# Patient Record
Sex: MALE | Race: WHITE | URBAN - METROPOLITAN AREA
[De-identification: names, ages, dates, MRNs, and addresses within clinical notes are randomized per-mention and may not be internally consistent; named-entity substitution may affect disease eponyms.]

---

## 2018-11-24 ENCOUNTER — IMMUNIZATION (OUTPATIENT)
Dept: FAMILY MEDICINE CLINIC | Facility: CLINIC | Age: 53
End: 2018-11-24
Payer: COMMERCIAL

## 2018-11-24 DIAGNOSIS — Z23 NEED FOR VACCINATION: ICD-10-CM

## 2018-11-24 PROCEDURE — 90686 IIV4 VACC NO PRSV 0.5 ML IM: CPT | Performed by: NURSE PRACTITIONER

## 2018-11-24 PROCEDURE — 90471 IMMUNIZATION ADMIN: CPT | Performed by: NURSE PRACTITIONER

## 2019-06-03 ENCOUNTER — OFFICE VISIT (OUTPATIENT)
Dept: DERMATOLOGY CLINIC | Facility: CLINIC | Age: 54
End: 2019-06-03
Payer: COMMERCIAL

## 2019-06-03 DIAGNOSIS — L57.8 SUN-DAMAGED SKIN: ICD-10-CM

## 2019-06-03 DIAGNOSIS — D23.61 BENIGN NEOPLASM OF SKIN OF RIGHT UPPER EXTREMITY AND SHOULDER: ICD-10-CM

## 2019-06-03 DIAGNOSIS — D48.5 NEOPLASM OF UNCERTAIN BEHAVIOR OF SKIN: Primary | ICD-10-CM

## 2019-06-03 PROCEDURE — 11102 TANGNTL BX SKIN SINGLE LES: CPT | Performed by: DERMATOLOGY

## 2019-06-03 PROCEDURE — 88305 TISSUE EXAM BY PATHOLOGIST: CPT | Performed by: DERMATOLOGY

## 2019-06-03 PROCEDURE — 99212 OFFICE O/P EST SF 10 MIN: CPT | Performed by: DERMATOLOGY

## 2019-06-03 PROCEDURE — 99202 OFFICE O/P NEW SF 15 MIN: CPT | Performed by: DERMATOLOGY

## 2019-06-03 NOTE — PROGRESS NOTES
History reviewed. No pertinent past medical history. History reviewed. No pertinent surgical history.   Social History    Socioeconomic History      Marital status:       Spouse name: Not on file      Number of children: Not on file      Years of ed

## 2019-06-03 NOTE — PROGRESS NOTES
HPI:     Chief Complaint     Lesion        HPI     Lesion      Additional comments: New Patient present with dark lesion on R hand and forearm , and L cheek .  Patient denies any personal or family hx of sc          Last edited by Sharma Brittle, [address] [place], [place] [address]linda Berger on 6/ file      Sexual activity: Not on file    Lifestyle      Physical activity:        Days per week: Not on file        Minutes per session: Not on file      Stress: Not on file    Relationships      Social connections:        Talks on phone: Not on file come sooner should they note  anything new or changing.   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

## 2019-06-06 ENCOUNTER — TELEPHONE (OUTPATIENT)
Dept: DERMATOLOGY CLINIC | Facility: CLINIC | Age: 54
End: 2019-06-06

## 2019-06-06 NOTE — TELEPHONE ENCOUNTER
Biopsy to right shoulder is melanoma. Per Pete Flores. FYI. Results released in [facility] [address]

## 2019-06-07 ENCOUNTER — TELEPHONE (OUTPATIENT)
Dept: DERMATOLOGY CLINIC | Facility: CLINIC | Age: 54
End: 2019-06-07

## 2019-06-07 NOTE — TELEPHONE ENCOUNTER
Please let pt know that waiting till the 17th is perfectly fine and will not put him at any increased risk

## 2019-06-12 ENCOUNTER — TELEPHONE (OUTPATIENT)
Dept: SURGERY | Facility: CLINIC | Age: 54
End: 2019-06-12

## 2019-06-15 ENCOUNTER — OFFICE VISIT (OUTPATIENT)
Dept: DERMATOLOGY CLINIC | Facility: CLINIC | Age: 54
End: 2019-06-15
Payer: COMMERCIAL

## 2019-06-15 DIAGNOSIS — L60.9 DISEASE OF NAIL: ICD-10-CM

## 2019-06-15 DIAGNOSIS — D48.5 NEOPLASM OF UNCERTAIN BEHAVIOR OF SKIN: Primary | ICD-10-CM

## 2019-06-15 DIAGNOSIS — D23.60 BENIGN NEOPLASM OF SKIN OF UPPER EXTREMITY AND SHOULDER, UNSPECIFIED LATERALITY: ICD-10-CM

## 2019-06-15 DIAGNOSIS — D23.4 BENIGN NEOPLASM OF SCALP AND SKIN OF NECK: ICD-10-CM

## 2019-06-15 DIAGNOSIS — C43.61 MELANOMA OF RIGHT UPPER ARM (HCC): ICD-10-CM

## 2019-06-15 DIAGNOSIS — D23.70 BENIGN NEOPLASM OF SKIN OF LOWER EXTREMITY, INCLUDING HIP, UNSPECIFIED LATERALITY: ICD-10-CM

## 2019-06-15 DIAGNOSIS — D23.30 BENIGN NEOPLASM OF SKIN OF FACE: ICD-10-CM

## 2019-06-15 DIAGNOSIS — D23.5 BENIGN NEOPLASM OF SKIN OF TRUNK, EXCEPT SCROTUM: ICD-10-CM

## 2019-06-15 DIAGNOSIS — L57.8 SUN-DAMAGED SKIN: ICD-10-CM

## 2019-06-15 DIAGNOSIS — L81.4 SOLAR LENTIGO: ICD-10-CM

## 2019-06-15 PROCEDURE — 99214 OFFICE O/P EST MOD 30 MIN: CPT | Performed by: DERMATOLOGY

## 2019-06-15 PROCEDURE — 11104 PUNCH BX SKIN SINGLE LESION: CPT | Performed by: DERMATOLOGY

## 2019-06-15 PROCEDURE — 88341 IMHCHEM/IMCYTCHM EA ADD ANTB: CPT | Performed by: DERMATOLOGY

## 2019-06-15 PROCEDURE — 88305 TISSUE EXAM BY PATHOLOGIST: CPT | Performed by: DERMATOLOGY

## 2019-06-15 PROCEDURE — 99212 OFFICE O/P EST SF 10 MIN: CPT | Performed by: DERMATOLOGY

## 2019-06-15 PROCEDURE — 88342 IMHCHEM/IMCYTCHM 1ST ANTB: CPT | Performed by: DERMATOLOGY

## 2019-06-15 NOTE — PROGRESS NOTES
HPI:     Chief Complaint     Full Skin Exam        HPI     Full Skin Exam      Additional comments: LOV 06/03/19 pt seen for lesion to his right hand and FA, here for full body check has no new spots of concern at this time, pt has personal Hx of Melanoma file      Number of children: Not on file      Years of education: Not on file      Highest education level: Not on file    Occupational History      Not on file    Social Needs      Financial resource strain: Not on file      Food insecurity:        Worry age.  Patient is well nourished and in no distress    The exam was remarkable for the following:  - there is scabbing at the biopsy site of the melanoma on the upper right shoulder.  - melanocytic nevi are fairly few in number and are uniform in color, sha or higher, hats, discussed  Benign neoplasm of skin of upper extremity and shoulder, unspecified laterality  Benign neoplasm of skin of lower extremity, including hip, unspecified laterality    Orders Placed This Encounter      PUNCH BIOPSY SKIN SINGLE LES

## 2019-06-15 NOTE — PROCEDURES
Procedural Report for Punch Biopsy    Procedure: With the patient is a supine position, the skin was scrubbed with alcohol. Anesthesia was obtained by injecting 2 mL of 1% Xylocaine with Epinephrine. A circular punch, 4 mm.  In size was used to incise

## 2019-06-17 ENCOUNTER — OFFICE VISIT (OUTPATIENT)
Dept: SURGERY | Facility: CLINIC | Age: 54
End: 2019-06-17
Payer: COMMERCIAL

## 2019-06-17 VITALS
DIASTOLIC BLOOD PRESSURE: 88 MMHG | RESPIRATION RATE: 16 BRPM | HEIGHT: 72 IN | TEMPERATURE: 98 F | WEIGHT: 199 LBS | BODY MASS INDEX: 26.95 KG/M2 | SYSTOLIC BLOOD PRESSURE: 152 MMHG | HEART RATE: 78 BPM | OXYGEN SATURATION: 98 %

## 2019-06-17 DIAGNOSIS — C43.61 MALIGNANT MELANOMA OF RIGHT SHOULDER (HCC): Primary | ICD-10-CM

## 2019-06-17 PROCEDURE — 99245 OFF/OP CONSLTJ NEW/EST HI 55: CPT | Performed by: SURGERY

## 2019-06-17 NOTE — CONSULTS
[id number] [facility] Surgical Oncology    Patient Name:  Washington Dailey   YOB: 1965   Gender:  Male   Appt Date:  6/17/2019   Provider:  Mari Romero MD   Insurance:  [facility]  Referring Dermatologist: Dr. Shanta Adame · GENERAL HEALTH: feels well, no fatigue. · SKIN:+ change in mole.    · HEENT: denies pain  · RESPIRATORY: denies shortness of breath, wheezing or cough   · CARDIOVASCULAR: denies chest pain, SOB, edema,orthopnea, no palpitations   · GI: denies nausea, vo Mitotic Rate  None identified    Microsatellite(s)  Not identified    Lymphovascular Invasion  Not identified    Neurotropism  Not identified    Tumor-Infiltrating Lymphocytes  Present, nonbrisk    Tumor Regression  Not identified    MARGINS     Peripheral

## 2019-06-27 ENCOUNTER — OFFICE VISIT (OUTPATIENT)
Dept: DERMATOLOGY CLINIC | Facility: CLINIC | Age: 54
End: 2019-06-27
Payer: COMMERCIAL

## 2019-06-27 DIAGNOSIS — Z48.02 VISIT FOR SUTURE REMOVAL: Primary | ICD-10-CM

## 2019-06-27 NOTE — PROGRESS NOTES
HPI:     Chief Complaint     Suture Removal        HPI     Suture Removal      Additional comments: Patient present for suture removal .Edges in tact no bleeding or leakage.  Provider exam site , flash monzon          Last edited by Gale Jimenez on file      Intimate partner violence:        Fear of current or ex partner: Not on file        Emotionally abused: Not on file        Physically abused: Not on file        Forced sexual activity: Not on file    Other Topics      Concerns:        Grew up

## 2019-07-08 ENCOUNTER — TELEPHONE (OUTPATIENT)
Dept: SURGERY | Facility: CLINIC | Age: 54
End: 2019-07-08

## 2019-07-08 NOTE — TELEPHONE ENCOUNTER
Confirmed procedure appointment for 7/10/19 at [address] [place] [address] patient to take tylenol prior to appointment. Patient voiced understanding.

## 2019-07-10 ENCOUNTER — OFFICE VISIT (OUTPATIENT)
Dept: SURGERY | Facility: CLINIC | Age: 54
End: 2019-07-10
Payer: COMMERCIAL

## 2019-07-10 VITALS
BODY MASS INDEX: 27.11 KG/M2 | HEIGHT: 72 IN | HEART RATE: 74 BPM | SYSTOLIC BLOOD PRESSURE: 153 MMHG | RESPIRATION RATE: 16 BRPM | OXYGEN SATURATION: 98 % | WEIGHT: 200.19 LBS | DIASTOLIC BLOOD PRESSURE: 87 MMHG

## 2019-07-10 DIAGNOSIS — C43.61 MALIGNANT MELANOMA OF RIGHT SHOULDER (HCC): Primary | ICD-10-CM

## 2019-07-10 PROCEDURE — 13121 CMPLX RPR S/A/L 2.6-7.5 CM: CPT | Performed by: SURGERY

## 2019-07-10 PROCEDURE — 88305 TISSUE EXAM BY PATHOLOGIST: CPT | Performed by: SURGERY

## 2019-07-10 PROCEDURE — 11603 EXC TR-EXT MAL+MARG 2.1-3 CM: CPT | Performed by: SURGERY

## 2019-07-10 NOTE — PATIENT INSTRUCTIONS
1.Change guaze as needed if it becomes saturated. You may remove gauze and shower after 2 days or as directed. 2. Do not soak in water or go swimming until sutures are removed.   3. After showering, you may leave incision uncovered and open to air or cover

## 2019-07-10 NOTE — PROCEDURES
Surgical Oncology Procedure      Preop and postop diagnoses: Malignant melanoma right shoulder, pT1a    Procedure: 1. Wide excision melanoma right shoulder, 2.5 cm.  2.  Complex closure, 5 cm.   Surgeon:    Gianna García MD    Assistant: Elba Singh,

## 2019-07-11 ENCOUNTER — TELEPHONE (OUTPATIENT)
Dept: SURGERY | Facility: CLINIC | Age: 54
End: 2019-07-11

## 2019-07-11 NOTE — TELEPHONE ENCOUNTER
Called to check on patient s/p wide excision yesterday. He has been doing well. Denies pain. Denies warmth, erythema, or drainage from site of incision. Reviewed incisional care. Confirmed post-op appointment for 7/31/19.  Patient knows to call back sooner

## 2019-07-12 ENCOUNTER — TELEPHONE (OUTPATIENT)
Dept: SURGERY | Facility: CLINIC | Age: 54
End: 2019-07-12

## 2019-07-12 NOTE — TELEPHONE ENCOUNTER
Call to pt regarding pathology results. Informed pt that everything was completely excised and there was no residual malignancy. Pt verbalized understanding.  Confirmed appt on 7/31/19 at 1:15pm.

## 2019-07-17 ENCOUNTER — TELEPHONE (OUTPATIENT)
Dept: SURGERY | Facility: CLINIC | Age: 54
End: 2019-07-17

## 2019-07-17 NOTE — TELEPHONE ENCOUNTER
Returned patient's message stating that he is having some redness near his incision. Request callback from patient to schedule office visit for assessment.

## 2019-07-24 ENCOUNTER — TELEPHONE (OUTPATIENT)
Dept: FAMILY MEDICINE CLINIC | Facility: CLINIC | Age: 54
End: 2019-07-24

## 2019-07-24 ENCOUNTER — TELEPHONE (OUTPATIENT)
Dept: SURGERY | Facility: CLINIC | Age: 54
End: 2019-07-24

## 2019-07-24 NOTE — TELEPHONE ENCOUNTER
Pt left a message stating that he saw his PCP today and they had mentioned to him that it would be best for him to have his sutures taken out today instead of waiting until next Wednesday.      Returned call to pt to offer an appointment this Friday to have

## 2019-07-24 NOTE — TELEPHONE ENCOUNTER
Discussed with pt. Pt was calling for culture results for his toe - still pending, pt informed we will contact him as soon as we get a final result.

## 2019-07-24 NOTE — TELEPHONE ENCOUNTER
Spoke to pt and he stated his PCP took his sutures out today. Pt denies any concerns with his incision. Pt stated Patt Collins has a post op appointment with Dr. Krish Robledo on 7/31/19 and if he still needs to come.  Advised pt I will follow up with Jessica Joel PA-C and

## 2019-07-26 ENCOUNTER — TELEPHONE (OUTPATIENT)
Dept: SURGERY | Facility: CLINIC | Age: 54
End: 2019-07-26

## 2019-07-26 NOTE — TELEPHONE ENCOUNTER
Informed patient that he does not need to be seen for post op next week since sutures were removed by pcp and patient has no complaints. Patient confirmed he will follow up with derm and keep 6 month f/u with us if needed.

## 2019-07-26 NOTE — TELEPHONE ENCOUNTER
Patient reports his PCP has removed his suture. He states his incision is c/d/i. There is no warmth, erythema, or drainage. He denies fever or chills. The patient is going on a business trip and reports he will not be able to come in next week.  Reviewed si

## 2019-08-08 NOTE — PROGRESS NOTES
Patient informed of results and recommendations with a verbal understanding. He will check with his doctor to see if he has LFTs and he will call us back. He would like to proceed with med.  Awaiting cb

## 2019-08-16 ENCOUNTER — TELEPHONE (OUTPATIENT)
Dept: DERMATOLOGY CLINIC | Facility: CLINIC | Age: 54
End: 2019-08-16

## 2019-08-16 DIAGNOSIS — B35.1 ONYCHOMYCOSIS: Primary | ICD-10-CM

## 2019-08-16 NOTE — TELEPHONE ENCOUNTER
Dr. Silva Cunha please see below. Would you like to review his CMP results from April? They are available in [address]. Dated 4/13/18.

## 2019-08-16 NOTE — TELEPHONE ENCOUNTER
Since labs were done almost 1.5 years ago, these would not be adequate- would need labs done within past 6 mo before prescribing the Lamisil- so please put in order for LFTs (hepatic panel) for him to do at his convenience- does not need to be facting

## 2019-08-16 NOTE — TELEPHONE ENCOUNTER
Pt has a complete metabolic panel results from last year 4/13/2018 from pcp. Is this what [facility]SS is looking for?   Please advise

## 2019-11-07 ENCOUNTER — OFFICE VISIT (OUTPATIENT)
Dept: DERMATOLOGY CLINIC | Facility: CLINIC | Age: 54
End: 2019-11-07
Payer: COMMERCIAL

## 2019-11-07 ENCOUNTER — APPOINTMENT (OUTPATIENT)
Dept: LAB | Age: 54
End: 2019-11-07
Attending: DERMATOLOGY
Payer: COMMERCIAL

## 2019-11-07 DIAGNOSIS — Z85.820 PERSONAL HISTORY OF MALIGNANT MELANOMA OF SKIN: Primary | ICD-10-CM

## 2019-11-07 DIAGNOSIS — D23.70 BENIGN NEOPLASM OF SKIN OF LOWER EXTREMITY, INCLUDING HIP, UNSPECIFIED LATERALITY: ICD-10-CM

## 2019-11-07 DIAGNOSIS — D23.5 BENIGN NEOPLASM OF SKIN OF TRUNK, EXCEPT SCROTUM: ICD-10-CM

## 2019-11-07 DIAGNOSIS — D23.30 BENIGN NEOPLASM OF SKIN OF FACE: ICD-10-CM

## 2019-11-07 DIAGNOSIS — C43.61 MELANOMA OF RIGHT UPPER ARM (HCC): ICD-10-CM

## 2019-11-07 DIAGNOSIS — L81.4 SOLAR LENTIGO: ICD-10-CM

## 2019-11-07 DIAGNOSIS — D23.61 BENIGN NEOPLASM OF SKIN OF RIGHT UPPER EXTREMITY AND SHOULDER: ICD-10-CM

## 2019-11-07 DIAGNOSIS — B35.1 ONYCHOMYCOSIS: ICD-10-CM

## 2019-11-07 DIAGNOSIS — D23.4 BENIGN NEOPLASM OF SCALP AND SKIN OF NECK: ICD-10-CM

## 2019-11-07 DIAGNOSIS — D23.60 BENIGN NEOPLASM OF SKIN OF UPPER EXTREMITY AND SHOULDER, UNSPECIFIED LATERALITY: ICD-10-CM

## 2019-11-07 PROCEDURE — 99214 OFFICE O/P EST MOD 30 MIN: CPT | Performed by: DERMATOLOGY

## 2019-11-07 PROCEDURE — 36415 COLL VENOUS BLD VENIPUNCTURE: CPT | Performed by: DERMATOLOGY

## 2019-11-07 PROCEDURE — 80076 HEPATIC FUNCTION PANEL: CPT | Performed by: DERMATOLOGY

## 2019-11-07 RX ORDER — TERBINAFINE HYDROCHLORIDE 250 MG/1
TABLET ORAL
Qty: 30 TABLET | Refills: 2 | Status: SHIPPED | OUTPATIENT
Start: 2019-11-07 | End: 2021-01-14 | Stop reason: ALTCHOICE

## 2019-11-07 NOTE — PROGRESS NOTES
HPI:     Chief Complaint     Lesion        HPI     Lesion      Additional comments: LOV 6/27/2019 Patient present for full body skin exam . Patient has hx of Melanoma           Last edited by Tiffany Diez on 11/7/2019 10:18 AM. (History)        Anthony Vargas Not on file        Non-medical: Not on file    Tobacco Use      Smoking status: Never Smoker      Smokeless tobacco: Never Used    Substance and Sexual Activity      Alcohol use: Yes        Frequency: 2-4 times a month      Drug use: Not on file      Sexua on face, trunk and extremities-the new lesion on left cheek is a small brown macule  - there are some cherry red papules  - there are many brown stuck on keratoses.   –Several toenails are slightly greenish with onycholysis        ASSESSMENT/PLAN:   [facility]

## 2020-02-03 ENCOUNTER — TELEPHONE (OUTPATIENT)
Dept: SURGERY | Facility: CLINIC | Age: 55
End: 2020-02-03

## 2020-02-03 NOTE — TELEPHONE ENCOUNTER
Contacted patient to confirm he has appointment with Derm, Dr. Anya Augustin on Friday. Patient informed he will be due to see Dr. Jair Fuller in 3 months for skin check, alternating with his derm appointments.  Appointment confirmed for 5/20/20 at 10:30am.

## 2020-02-08 ENCOUNTER — OFFICE VISIT (OUTPATIENT)
Dept: DERMATOLOGY CLINIC | Facility: CLINIC | Age: 55
End: 2020-02-08
Payer: COMMERCIAL

## 2020-02-08 DIAGNOSIS — D23.4 BENIGN NEOPLASM OF SCALP AND SKIN OF NECK: ICD-10-CM

## 2020-02-08 DIAGNOSIS — Z85.820 PERSONAL HISTORY OF MALIGNANT MELANOMA OF SKIN: Primary | ICD-10-CM

## 2020-02-08 DIAGNOSIS — L81.4 SOLAR LENTIGO: ICD-10-CM

## 2020-02-08 DIAGNOSIS — D23.70 BENIGN NEOPLASM OF SKIN OF LOWER EXTREMITY, INCLUDING HIP, UNSPECIFIED LATERALITY: ICD-10-CM

## 2020-02-08 DIAGNOSIS — D23.60 BENIGN NEOPLASM OF SKIN OF UPPER EXTREMITY AND SHOULDER, UNSPECIFIED LATERALITY: ICD-10-CM

## 2020-02-08 DIAGNOSIS — D23.5 BENIGN NEOPLASM OF SKIN OF TRUNK, EXCEPT SCROTUM: ICD-10-CM

## 2020-02-08 DIAGNOSIS — D23.30 BENIGN NEOPLASM OF SKIN OF FACE: ICD-10-CM

## 2020-02-08 PROCEDURE — 99214 OFFICE O/P EST MOD 30 MIN: CPT | Performed by: DERMATOLOGY

## 2020-02-08 NOTE — PROGRESS NOTES
HPI:     Chief Complaint     Full Skin Exam        HPI     Full Skin Exam      Additional comments: established pt, presents for 3 months skin exam, personal hx of melanoma to right shoulder in 2019. Pt denies any skin changes.            Last edited by Marco Antonio on file        Inability: Not on file      Transportation needs:        Medical: Not on file        Non-medical: Not on file    Tobacco Use      Smoking status: Never Smoker      Smokeless tobacco: Never Used    Substance and Sexual Activity      Alcohol u above.  – There is no conjunctival pigmentation.  - melanocytic nevi are uniform in color, shape and borders.   They are relatively few in number  -there are scattered blotchy brown macules on face, trunk and extremities  - there are some cherry red papules

## 2020-07-14 ENCOUNTER — OFFICE VISIT (OUTPATIENT)
Dept: DERMATOLOGY CLINIC | Facility: CLINIC | Age: 55
End: 2020-07-14
Payer: COMMERCIAL

## 2020-07-14 DIAGNOSIS — L81.4 SOLAR LENTIGO: ICD-10-CM

## 2020-07-14 DIAGNOSIS — Z85.820 PERSONAL HISTORY OF MALIGNANT MELANOMA OF SKIN: Primary | ICD-10-CM

## 2020-07-14 DIAGNOSIS — D23.60 BENIGN NEOPLASM OF SKIN OF UPPER EXTREMITY AND SHOULDER, UNSPECIFIED LATERALITY: ICD-10-CM

## 2020-07-14 DIAGNOSIS — D23.5 BENIGN NEOPLASM OF SKIN OF TRUNK, EXCEPT SCROTUM: ICD-10-CM

## 2020-07-14 DIAGNOSIS — D23.4 BENIGN NEOPLASM OF SCALP AND SKIN OF NECK: ICD-10-CM

## 2020-07-14 DIAGNOSIS — D23.30 BENIGN NEOPLASM OF SKIN OF FACE: ICD-10-CM

## 2020-07-14 DIAGNOSIS — D23.70 BENIGN NEOPLASM OF SKIN OF LOWER EXTREMITY, INCLUDING HIP, UNSPECIFIED LATERALITY: ICD-10-CM

## 2020-07-14 PROCEDURE — 99214 OFFICE O/P EST MOD 30 MIN: CPT | Performed by: DERMATOLOGY

## 2020-07-14 NOTE — PROGRESS NOTES
HPI:     Chief Complaint     Full Skin Exam        HPI     Full Skin Exam      Additional comments: LOV 2/8/2020 Patient present for full body skin exam .Patient has hx of melanom          Last edited by Anila Krishna, Tiffany Beregr on 7/14/2020  3:28 PM. (History file    Social Needs      Financial resource strain: Not on file      Food insecurity:        Worry: Not on file        Inability: Not on file      Transportation needs:        Medical: Not on file        Non-medical: Not on file    Tobacco Use      Marcoin right shoulder  –There is no appreciable adenopathy in locations as noted above.   –There is no conjunctival pigmentation.  - melanocytic nevi are uniform in color, shape and borders.   -there are scattered blotchy brown macules on face, trunk and extremiti

## 2021-01-14 ENCOUNTER — OFFICE VISIT (OUTPATIENT)
Dept: DERMATOLOGY CLINIC | Facility: CLINIC | Age: 56
End: 2021-01-14
Payer: COMMERCIAL

## 2021-01-14 DIAGNOSIS — D18.01 HEMANGIOMA OF SKIN AND SUBCUTANEOUS TISSUE: ICD-10-CM

## 2021-01-14 DIAGNOSIS — D23.4 BENIGN NEOPLASM OF SCALP AND SKIN OF NECK: ICD-10-CM

## 2021-01-14 DIAGNOSIS — D23.60 BENIGN NEOPLASM OF SKIN OF UPPER EXTREMITY AND SHOULDER, UNSPECIFIED LATERALITY: ICD-10-CM

## 2021-01-14 DIAGNOSIS — D23.5 BENIGN NEOPLASM OF SKIN OF TRUNK, EXCEPT SCROTUM: ICD-10-CM

## 2021-01-14 DIAGNOSIS — L57.0 ACTINIC KERATOSIS: ICD-10-CM

## 2021-01-14 DIAGNOSIS — L81.4 SOLAR LENTIGO: ICD-10-CM

## 2021-01-14 DIAGNOSIS — Z85.820 PERSONAL HISTORY OF MALIGNANT MELANOMA OF SKIN: Primary | ICD-10-CM

## 2021-01-14 DIAGNOSIS — D23.70 BENIGN NEOPLASM OF SKIN OF LOWER EXTREMITY, INCLUDING HIP, UNSPECIFIED LATERALITY: ICD-10-CM

## 2021-01-14 DIAGNOSIS — D23.30 BENIGN NEOPLASM OF SKIN OF FACE: ICD-10-CM

## 2021-01-14 DIAGNOSIS — D48.5 NEOPLASM OF UNCERTAIN BEHAVIOR OF SKIN: ICD-10-CM

## 2021-01-14 PROCEDURE — 17000 DESTRUCT PREMALG LESION: CPT | Performed by: DERMATOLOGY

## 2021-01-14 PROCEDURE — 99214 OFFICE O/P EST MOD 30 MIN: CPT | Performed by: DERMATOLOGY

## 2021-01-14 PROCEDURE — 88305 TISSUE EXAM BY PATHOLOGIST: CPT | Performed by: DERMATOLOGY

## 2021-01-14 PROCEDURE — 11102 TANGNTL BX SKIN SINGLE LES: CPT | Performed by: DERMATOLOGY

## 2021-01-14 NOTE — PROGRESS NOTES
HPI:     Chief Complaint     Full Skin Exam        HPI     Full Skin Exam      Additional comments: LOV 7/14/2020 Patient present for full body skin exam. Patient has hx Melanoma           Last edited by Donato Cedillo, Tiffany Berger on 1/14/2021  3:50 PM. (History Medical: Not on file        Non-medical: Not on file    Tobacco Use      Smoking status: Never Smoker      Smokeless tobacco: Never Used    Substance and Sexual Activity      Alcohol use: Yes        Frequency: 2-4 times a month      Drug use: Not on file examined with dermoscopy. There is however an oblong shaped darkly pigmented macule at the right mid back.   Some stretching of reticular network on dermoscopy  -there are scattered blotchy brown macules on face, trunk and extremities  - there are some nellie Referral Orders:  No orders of the defined types were placed in this encounter.         1/14/2021  Hazel Madsen

## 2021-01-18 NOTE — PROGRESS NOTES
Results logged in. Patient informed of test results and all [facility]SS' recommendations. Voiced understanding.

## 2021-10-18 ENCOUNTER — OFFICE VISIT (OUTPATIENT)
Dept: DERMATOLOGY CLINIC | Facility: CLINIC | Age: 56
End: 2021-10-18
Payer: COMMERCIAL

## 2021-10-18 DIAGNOSIS — Z85.820 PERSONAL HISTORY OF MALIGNANT MELANOMA OF SKIN: ICD-10-CM

## 2021-10-18 DIAGNOSIS — L57.0 ACTINIC KERATOSIS: Primary | ICD-10-CM

## 2021-10-18 DIAGNOSIS — L81.4 SOLAR LENTIGO: ICD-10-CM

## 2021-10-18 DIAGNOSIS — D22.9 MULTIPLE MELANOCYTIC NEVI: ICD-10-CM

## 2021-10-18 DIAGNOSIS — D18.01 HEMANGIOMA OF SKIN AND SUBCUTANEOUS TISSUE: ICD-10-CM

## 2021-10-18 PROCEDURE — 99213 OFFICE O/P EST LOW 20 MIN: CPT | Performed by: DERMATOLOGY

## 2021-10-18 PROCEDURE — 17000 DESTRUCT PREMALG LESION: CPT | Performed by: DERMATOLOGY

## 2021-10-18 RX ORDER — TAMSULOSIN HYDROCHLORIDE 0.4 MG/1
0.4 CAPSULE ORAL DAILY
COMMUNITY
Start: 2021-09-02

## 2021-10-18 NOTE — PROGRESS NOTES
HPI:     Chief Complaint     Derm Problem        HPI     Derm Problem      Additional comments: LOV 1/14/21. Patient presents for 6 mon full body check.  [facility]          Last edited by ANDER Prado on 10/18/2021  8:12 AM. (History)          Pamela Winn anesthetic: No    Social History Narrative      Not on file    Social Determinants of Health  Financial Resource Strain:       Difficulty of Paying Living Expenses: Not on file  Food Insecurity:       Worried About Running Out of Food in the Last Year: Not axillary adenopathy  - melanocytic nevi are uniform in color, shape and borders.  -there are scattered blotchy brown macules on face, trunk and extremities  - there is 1 area of erythema and keratotic scale on the right cheek  - there are some cherry red p

## 2022-02-28 PROBLEM — S52.572A CLOSED DIE-PUNCH INTRA-ARTICULAR FRACTURE OF DISTAL RADIUS, LEFT, INITIAL ENCOUNTER: Status: ACTIVE | Noted: 2022-02-28

## 2022-04-07 PROBLEM — S52.572D RADIUS FRACTURE DIE PUNCH, CLOSED, LEFT, WITH ROUTINE HEALING, SUBSEQUENT ENCOUNTER: Status: ACTIVE | Noted: 2022-04-07

## 2022-04-18 ENCOUNTER — OFFICE VISIT (OUTPATIENT)
Dept: DERMATOLOGY CLINIC | Facility: CLINIC | Age: 57
End: 2022-04-18
Payer: COMMERCIAL

## 2022-04-18 DIAGNOSIS — L81.4 SOLAR LENTIGO: ICD-10-CM

## 2022-04-18 DIAGNOSIS — D22.9 MULTIPLE MELANOCYTIC NEVI: Primary | ICD-10-CM

## 2022-04-18 DIAGNOSIS — Z85.820 PERSONAL HISTORY OF MALIGNANT MELANOMA OF SKIN: ICD-10-CM

## 2022-04-18 DIAGNOSIS — D23.60 BENIGN NEOPLASM OF SKIN OF UPPER EXTREMITY AND SHOULDER, UNSPECIFIED LATERALITY: ICD-10-CM

## 2022-04-18 DIAGNOSIS — D23.4 BENIGN NEOPLASM OF SCALP AND SKIN OF NECK: ICD-10-CM

## 2022-04-18 DIAGNOSIS — L57.0 ACTINIC KERATOSIS: ICD-10-CM

## 2022-04-18 DIAGNOSIS — D23.70 BENIGN NEOPLASM OF SKIN OF LOWER EXTREMITY, INCLUDING HIP, UNSPECIFIED LATERALITY: ICD-10-CM

## 2022-04-18 DIAGNOSIS — D23.30 BENIGN NEOPLASM OF SKIN OF FACE: ICD-10-CM

## 2022-04-18 DIAGNOSIS — D23.5 BENIGN NEOPLASM OF SKIN OF TRUNK, EXCEPT SCROTUM: ICD-10-CM

## 2022-04-18 PROCEDURE — 17000 DESTRUCT PREMALG LESION: CPT | Performed by: DERMATOLOGY

## 2022-04-18 PROCEDURE — 99214 OFFICE O/P EST MOD 30 MIN: CPT | Performed by: DERMATOLOGY

## 2022-10-24 ENCOUNTER — OFFICE VISIT (OUTPATIENT)
Dept: DERMATOLOGY CLINIC | Facility: CLINIC | Age: 57
End: 2022-10-24
Payer: COMMERCIAL

## 2022-10-24 DIAGNOSIS — Z85.820 PERSONAL HISTORY OF MALIGNANT MELANOMA OF SKIN: ICD-10-CM

## 2022-10-24 DIAGNOSIS — L57.0 ACTINIC KERATOSIS: Primary | ICD-10-CM

## 2022-10-24 DIAGNOSIS — L81.4 SOLAR LENTIGO: ICD-10-CM

## 2022-10-24 DIAGNOSIS — D22.9 MULTIPLE MELANOCYTIC NEVI: ICD-10-CM

## 2022-10-24 DIAGNOSIS — Z86.018 HISTORY OF DYSPLASTIC NEVUS: ICD-10-CM

## 2022-10-24 PROCEDURE — 99213 OFFICE O/P EST LOW 20 MIN: CPT | Performed by: DERMATOLOGY

## 2022-10-24 RX ORDER — COVID-19 ANTIGEN TEST
KIT MISCELLANEOUS
COMMUNITY
Start: 2022-07-24

## 2022-10-24 RX ORDER — SILDENAFIL 100 MG/1
50 TABLET, FILM COATED ORAL
COMMUNITY
Start: 2022-08-10

## 2023-04-24 ENCOUNTER — OFFICE VISIT (OUTPATIENT)
Dept: DERMATOLOGY CLINIC | Facility: CLINIC | Age: 58
End: 2023-04-24

## 2023-04-24 DIAGNOSIS — D23.70 BENIGN NEOPLASM OF SKIN OF LOWER EXTREMITY, INCLUDING HIP, UNSPECIFIED LATERALITY: ICD-10-CM

## 2023-04-24 DIAGNOSIS — Z86.018 HISTORY OF DYSPLASTIC NEVUS: ICD-10-CM

## 2023-04-24 DIAGNOSIS — D23.60 BENIGN NEOPLASM OF SKIN OF UPPER EXTREMITY AND SHOULDER, UNSPECIFIED LATERALITY: ICD-10-CM

## 2023-04-24 DIAGNOSIS — D22.9 MULTIPLE MELANOCYTIC NEVI: ICD-10-CM

## 2023-04-24 DIAGNOSIS — D23.5 BENIGN NEOPLASM OF SKIN OF TRUNK, EXCEPT SCROTUM: ICD-10-CM

## 2023-04-24 DIAGNOSIS — D23.30 BENIGN NEOPLASM OF SKIN OF FACE: ICD-10-CM

## 2023-04-24 DIAGNOSIS — D18.01 HEMANGIOMA OF SKIN AND SUBCUTANEOUS TISSUE: ICD-10-CM

## 2023-04-24 DIAGNOSIS — L81.4 SOLAR LENTIGO: ICD-10-CM

## 2023-04-24 DIAGNOSIS — L57.0 ACTINIC KERATOSIS: Primary | ICD-10-CM

## 2023-04-24 DIAGNOSIS — D23.4 BENIGN NEOPLASM OF SCALP AND SKIN OF NECK: ICD-10-CM

## 2023-04-24 DIAGNOSIS — Z85.820 PERSONAL HISTORY OF MALIGNANT MELANOMA OF SKIN: ICD-10-CM

## 2023-04-24 PROCEDURE — 99213 OFFICE O/P EST LOW 20 MIN: CPT | Performed by: DERMATOLOGY

## 2023-10-25 ENCOUNTER — OFFICE VISIT (OUTPATIENT)
Dept: DERMATOLOGY CLINIC | Facility: CLINIC | Age: 58
End: 2023-10-25
Payer: COMMERCIAL

## 2023-10-25 DIAGNOSIS — L81.4 SOLAR LENTIGO: ICD-10-CM

## 2023-10-25 DIAGNOSIS — L57.0 ACTINIC KERATOSIS: ICD-10-CM

## 2023-10-25 DIAGNOSIS — L82.1 SK (SEBORRHEIC KERATOSIS): Primary | ICD-10-CM

## 2023-10-25 DIAGNOSIS — D18.01 HEMANGIOMA OF SKIN AND SUBCUTANEOUS TISSUE: ICD-10-CM

## 2023-10-25 DIAGNOSIS — D22.9 MULTIPLE MELANOCYTIC NEVI: ICD-10-CM

## 2023-10-25 DIAGNOSIS — Z85.820 PERSONAL HISTORY OF MALIGNANT MELANOMA OF SKIN: ICD-10-CM

## 2023-10-25 DIAGNOSIS — Z86.018 HISTORY OF DYSPLASTIC NEVUS: ICD-10-CM

## 2023-10-25 DIAGNOSIS — D23.9 BENIGN NEOPLASM OF SKIN, UNSPECIFIED LOCATION: ICD-10-CM

## 2023-10-25 PROCEDURE — 99213 OFFICE O/P EST LOW 20 MIN: CPT | Performed by: DERMATOLOGY

## 2023-11-06 NOTE — PROGRESS NOTES
Charla Parisi is a 60year old male. HPI:     CC:  Patient presents with:  Full Skin Exam: LOV 04/23. Pt has hx of melanoma and dysplastic nevus. Pt present for full body exam. Pt denies hx of skin ca. Allergies:  Patient has no known allergies. HISTORY:    Past Medical History:   Diagnosis Date    Actinic keratosis 10/2021    Nose    Dysplastic nevus 2021    mild dysplasia, right mid back    Melanoma of skin ([address] 2019    Right shoulder 0.4mm      Past Surgical History:   Procedure Laterality Date    COLONOSCOPY      OTHER SURGICAL HISTORY  1986    thumb repair    VASECTOMY        Family History   Problem Relation Age of Onset    Diabetes Father     Obesity Father       Social History     Socioeconomic History    Marital status:    Tobacco Use    Smoking status: Never    Smokeless tobacco: Never   Substance and Sexual Activity    Alcohol use: Yes     Alcohol/week: 4.0 standard drinks of alcohol     Types: 3 Cans of beer, 1 Shots of liquor per week    Drug use: Never   Other Topics Concern    Grew up on a farm No    History of tanning No    Outdoor occupation No    Reaction to local anesthetic No    Pt has a pacemaker No    Pt has a defibrillator No        Current Outpatient Medications   Medication Sig Dispense Refill    tamsulosin (FLOMAX) cap Take 1 capsule (0.4 mg total) by mouth daily. QUICKVUE AT-HOME COVID-19 TEST In Vitro Kit AS DIR (Patient not taking: Reported on 10/24/2022)      Sildenafil Citrate 100 MG Oral Tab Take 50 mg by mouth.  (Patient not taking: Reported on 4/24/2023)       Allergies:   No Known Allergies    Past Medical History:   Diagnosis Date    Actinic keratosis 10/2021    Nose    Dysplastic nevus 2021    mild dysplasia, right mid back    Melanoma of skin ([address] 2019    Right shoulder 0.4mm     Past Surgical History:   Procedure Laterality Date    COLONOSCOPY      OTHER SURGICAL HISTORY  1986    thumb repair    VASECTOMY       Social History    Socioeconomic History      Marital status:       Spouse name: Not on file      Number of children: Not on file      Years of education: Not on file      Highest education level: Not on file    Occupational History      Not on file    Tobacco Use      Smoking status: Never      Smokeless tobacco: Never    Substance and Sexual Activity      Alcohol use: Yes        Alcohol/week: 4.0 standard drinks of alcohol        Types: 3 Cans of beer, 1 Shots of liquor per week      Drug use: Never      Sexual activity: Not on file    Other Topics      Concerns:        Grew up on a farm: No        History of tanning: No        Outdoor occupation: No        Reaction to local anesthetic: No        Pt has a pacemaker: No        Pt has a defibrillator: No    Social History Narrative      Not on file    Social Determinants of Health  Financial Resource Strain: Not on file  Food Insecurity: Not on file  Transportation Needs: Not on file  Physical Activity: Not on file  Stress: Not on file  Social Connections: Not on file  Housing Stability: Not on file  Family History   Problem Relation Age of Onset    Diabetes Father     Obesity Father        There were no vitals filed for this visit. HPI:    Patient presents with:  Full Skin Exam: LOV 04/23. Pt has hx of melanoma and dysplastic nevus. Pt present for full body exam. Pt denies hx of skin ca. Patient with history of melanoma dysplastic nevi lower back  Overall has been doing well no particular lesions of concern has been careful sun protection. History of AK's. Doing some traveling. Careful with sun protection. No particular concerns will continue to be careful sun protection past notes/ records and appropriate/relevant lab results including pathology and past body maps reviewed. Including outside notes/ PCP notes as appropriate. Updated and new information noted in current visit. Patient presents with concerns above. Patient has been in their usual state of health. History, medications, allergies reviewed as noted. ROS:  new relevant systemic complaints as noted       Physical Examination:     Well-developed well-nourished patient alert oriented in no acute distress. Exam total-body performed, including scalp, head, neck, face,nails, hair, external eyes, including conjunctival mucosa, eyelids, lips external ears, back, chest,/ breasts, axillae,  abdomen, arms, legs, palms. Multiple light to medium brown, well marginated, uniformly pigmented, macules and papules 6 mm and less scattered on exam. pigmented lesions examined with dermoscopy benign-appearing patterns. Waxy tannish keratotic papules scattered, cherry-red vascular papules scattered. See map today's date for lesions noted . Otherwise remarkable for lesions as noted on map. See details of examination  See Assessment /Plan for additional history and physical exam also:    Assessment / plan:    No orders of the defined types were placed in this encounter. Meds & Refills for this Visit:  Requested Prescriptions      No prescriptions requested or ordered in this encounter         Sk (seborrheic keratosis)  (primary encounter diagnosis)  Solar lentigo  Multiple melanocytic nevi  History of dysplastic nevus  Personal history of malignant melanoma of skin  Actinic keratosis  Hemangioma of skin and subcutaneous tissue  Benign neoplasm of skin, unspecified location    See details on map. Remarkable for:    History of AK's no active lesions. Previous procedure discussed continue sunscreen sun protection continue monitoring slight scaling at left lateral forehead early changes of AK's. Consider options for treatment if these persist in the fall. Aggressive sun protection    Otherwise pigmented lesions stable no other significant changes.     Multiple lentigines over the face    Crusted area left arm    History of melanoma 0.4 mm right shoulder no recurrence, history dysplastic nevus mildly dysplastic at right interscapular back  Observe no evidence of recurrence    Multiple nevi, lentigines no significant changes in exam.  Nothing else new or different. Skin tags over the lower back, multiple keratoses over the lower extremities, no new suspicious lesions  Multiple benign keratoses over the back  Numerous nevi over the back appear benign on dermoscopy    Darker brown macule slightly scaly at right pretibial leg observe carefully 4 mm. Follow-up ideally every 4 to 6 months. Sunscreen and sun protection. Self exams    No other susupicious lesions on todays  exam.    Please refer to map for specific lesions. See additional diagnoses. Pros cons of various therapies, risks benefits discussed. Pathophysiology discussed with patient. Therapeutic options reviewed. See  Medications in grid. Instructions reviewed at length. Benign nevi, seborrheic  keratoses, cherry angiomas:  Reassurance regarding other benign skin lesions. Signs and symptoms of skin cancer, ABCDE's of melanoma discussed with patient. Sunscreen use, sun protection, self exams reviewed. Followup as noted RTC routine checkup 6 mos - one year or p.r.n. Encounter Times Including precharting, reviewing chart, prior notes obtaining history: 10 minutes, medical exam :10 minutes, notes on body map, plan, counseling 10minutes My total time spent caring for the patient on the day of the encounter: 30 minutes     The patient indicates understanding of these issues and agrees to the plan. The patient is asked to return as noted in follow-up/ above. This note was generated using Dragon voice recognition software. Please contact me regarding any confusion resulting from errors in recognition. Note to patient and family: The [facility] makes medical notes like these available to patients. However, be advised this is a medical document. It is intended as [id number] communication and monitoring of a patient's care needs.  It is written in medical language and may contain abbreviations or verbiage that are unfamiliar. It may appear blunt or direct. Medical documents are intended to carry relevant information, facts as evident and the clinical opinion of the practitioner.

## 2024-04-22 ENCOUNTER — OFFICE VISIT (OUTPATIENT)
Dept: DERMATOLOGY CLINIC | Facility: CLINIC | Age: 59
End: 2024-04-22
Payer: COMMERCIAL

## 2024-04-22 DIAGNOSIS — Z86.018 HISTORY OF DYSPLASTIC NEVUS: ICD-10-CM

## 2024-04-22 DIAGNOSIS — L82.1 SK (SEBORRHEIC KERATOSIS): ICD-10-CM

## 2024-04-22 DIAGNOSIS — Z08 ENCOUNTER FOR FOLLOW-UP SURVEILLANCE OF MELANOMA: ICD-10-CM

## 2024-04-22 DIAGNOSIS — Z85.820 ENCOUNTER FOR FOLLOW-UP SURVEILLANCE OF MELANOMA: ICD-10-CM

## 2024-04-22 DIAGNOSIS — L81.4 SOLAR LENTIGO: Primary | ICD-10-CM

## 2024-04-22 DIAGNOSIS — D22.9 MULTIPLE MELANOCYTIC NEVI: ICD-10-CM

## 2024-04-22 DIAGNOSIS — L57.0 ACTINIC KERATOSIS: ICD-10-CM

## 2024-04-22 PROCEDURE — 99213 OFFICE O/P EST LOW 20 MIN: CPT | Performed by: DERMATOLOGY

## 2024-05-05 NOTE — PROGRESS NOTES
Aric Allison is a 58 year old male.  HPI:     CC:    Chief Complaint   Patient presents with    Full Skin Exam     LOV 10/23. Pt present for full body exam. Pt has hx of melanoma and dysplastic nevus. Pt denies any areas of concern.          Allergies:  Patient has no known allergies.    HISTORY:    Past Medical History:    Actinic keratosis    Nose    Dysplastic nevus    mild dysplasia, right mid back    Melanoma of skin (HCC)    Right shoulder 0.4mm      Past Surgical History:   Procedure Laterality Date    Colonoscopy      Other surgical history  1986    thumb repair    Vasectomy        Family History   Problem Relation Age of Onset    Diabetes Father     Obesity Father       Social History     Socioeconomic History    Marital status:    Tobacco Use    Smoking status: Never    Smokeless tobacco: Never   Substance and Sexual Activity    Alcohol use: Yes     Alcohol/week: 4.0 standard drinks of alcohol     Types: 3 Cans of beer, 1 Shots of liquor per week    Drug use: Never   Other Topics Concern    Grew up on a farm No    History of tanning No    Outdoor occupation No    Reaction to local anesthetic No    Pt has a pacemaker No    Pt has a defibrillator No        Current Outpatient Medications   Medication Sig Dispense Refill    Sildenafil Citrate 100 MG Oral Tab Take 0.5 tablets (50 mg total) by mouth.      tamsulosin (FLOMAX) cap Take 1 capsule (0.4 mg total) by mouth daily.      QUICKVUE AT-HOME COVID-19 TEST In Vitro Kit AS DIR (Patient not taking: Reported on 10/24/2022)       Allergies:   No Known Allergies    Past Medical History:    Actinic keratosis    Nose    Dysplastic nevus    mild dysplasia, right mid back    Melanoma of skin (HCC)    Right shoulder 0.4mm     Past Surgical History:   Procedure Laterality Date    Colonoscopy      Other surgical history  1986    thumb repair    Vasectomy       Social History     Socioeconomic History    Marital status:      Spouse name: Not on file     Number of children: Not on file    Years of education: Not on file    Highest education level: Not on file   Occupational History    Not on file   Tobacco Use    Smoking status: Never    Smokeless tobacco: Never   Substance and Sexual Activity    Alcohol use: Yes     Alcohol/week: 4.0 standard drinks of alcohol     Types: 3 Cans of beer, 1 Shots of liquor per week    Drug use: Never    Sexual activity: Not on file   Other Topics Concern    Grew up on a farm No    History of tanning No    Outdoor occupation No    Reaction to local anesthetic No    Pt has a pacemaker No    Pt has a defibrillator No   Social History Narrative    Not on file     Social Determinants of Health     Financial Resource Strain: Not on file   Food Insecurity: Not on file   Transportation Needs: Not on file   Physical Activity: Not on file   Stress: Not on file   Social Connections: Not on file   Housing Stability: Not on file     Family History   Problem Relation Age of Onset    Diabetes Father     Obesity Father        There were no vitals filed for this visit.    HPI:    Chief Complaint   Patient presents with    Full Skin Exam     LOV 10/23. Pt present for full body exam. Pt has hx of melanoma and dysplastic nevus. Pt denies any areas of concern.      Patient with history of melanoma dysplastic nevi lower back  Overall has been doing well no particular lesions of concern has been careful sun protection.  History of AK's.   Doing some traveling.  Careful with sun protection.      No particular concerns will continue to be careful sun protection past notes/ records and appropriate/relevant lab results including pathology and past body maps reviewed. Including outside notes/ PCP notes as appropriate. Updated and new information noted in current visit.     Patient presents with concerns above.    Patient has been in their usual state of health.      History, medications, allergies reviewed as noted.      ROS:  new relevant systemic complaints  as noted       Physical Examination:     Well-developed well-nourished patient alert oriented in no acute distress.  Exam total-body performed, including scalp, head, neck, face,nails, hair, external eyes, including conjunctival mucosa, eyelids, lips external ears, back, chest,/ breasts, axillae,  abdomen, arms, legs, palms.     Multiple light to medium brown, well marginated, uniformly pigmented, macules and papules 6 mm and less scattered on exam. pigmented lesions examined with dermoscopy benign-appearing patterns.     Waxy tannish keratotic papules scattered, cherry-red vascular papules scattered.    See map today's date for lesions noted .      Otherwise remarkable for lesions as noted on map.  See details of examination  See Assessment /Plan for additional history and physical exam also:    Assessment / plan:    No orders of the defined types were placed in this encounter.      Meds & Refills for this Visit:  Requested Prescriptions      No prescriptions requested or ordered in this encounter         Encounter Diagnoses   Name Primary?    Solar lentigo Yes    SK (seborrheic keratosis)     Multiple melanocytic nevi     History of dysplastic nevus     Actinic keratosis     Encounter for follow-up surveillance of melanoma        See details on map.      Remarkable for:  Exam essentially unchanged no new suspicious pigmented lesions.  No active AK's    Patient encouraged to be careful with sun exposure.  Particularly when on safari -  history of AK's no active lesions.  Previous procedure discussed continue sunscreen sun protection continue monitoring slight scaling at left lateral forehead early changes of AK's.  Consider options for treatment if these persist in the fall.  Aggressive sun protection    Otherwise pigmented lesions stable no other significant changes.    Multiple lentigines over the face    History of melanoma 0.4 mm right shoulder no recurrence, history dysplastic nevus mildly dysplastic at right  interscapular back  Observe no evidence of recurrence    Multiple nevi, lentigines no significant changes in exam.  Nothing else new or different.    Skin tags over the lower back, multiple keratoses over the lower extremities, no new suspicious lesions  Multiple benign keratoses over the back  Numerous nevi over the back appear benign on dermoscopy    Darker brown macule slightly scaly at right pretibial leg observe carefully 4 mm.  Follow-up ideally every 4 to 6 months.  Sunscreen and sun protection.  Self exams    No other susupicious lesions on todays  exam.    Please refer to map for specific lesions.  See additional diagnoses.  Pros cons of various therapies, risks benefits discussed.Pathophysiology discussed with patient.  Therapeutic options reviewed.  See  Medications in grid.  Instructions reviewed at length.    Benign nevi, seborrheic  keratoses, cherry angiomas:  Reassurance regarding other benign skin lesions.Signs and symptoms of skin cancer, ABCDE's of melanoma discussed with patient. Sunscreen use, sun protection, self exams reviewed.  Followup as noted RTC routine checkup 6 mos - one year or p.r.n.    Encounter Times Including precharting, reviewing chart, prior notes obtaining history: 10 minutes, medical exam :10 minutes, notes on body map, plan, counseling 10minutes My total time spent caring for the patient on the day of the encounter: 30 minutes     The patient indicates understanding of these issues and agrees to the plan.  The patient is asked to return as noted in follow-up/ above.    This note was generated using Dragon voice recognition software.  Please contact me regarding any confusion resulting from errors in recognition.   Note to patient and family: The 21st Century Cures Act makes medical notes like these available to patients. However, be advised this is a medical document. It is intended as [id number] communication and monitoring of a patient's care needs. It is written in  medical language and may contain abbreviations or verbiage that are unfamiliar. It may appear blunt or direct. Medical documents are intended to carry relevant information, facts as evident and the clinical opinion of the practitioner.

## 2024-05-08 NOTE — TELEPHONE ENCOUNTER
Pt is wanting to know if waiting till the 17th if ok for him to see the specialist to get a better cleaning of the melanoma spot. . Doctor is out of office till that week,, so please call to discus 4 = No assist / stand by assistance

## 2024-10-21 ENCOUNTER — OFFICE VISIT (OUTPATIENT)
Dept: DERMATOLOGY CLINIC | Facility: CLINIC | Age: 59
End: 2024-10-21
Payer: COMMERCIAL

## 2024-10-21 DIAGNOSIS — L82.1 SK (SEBORRHEIC KERATOSIS): ICD-10-CM

## 2024-10-21 DIAGNOSIS — D22.9 MULTIPLE MELANOCYTIC NEVI: ICD-10-CM

## 2024-10-21 DIAGNOSIS — L57.8 SUN-DAMAGED SKIN: ICD-10-CM

## 2024-10-21 DIAGNOSIS — L57.0 ACTINIC KERATOSIS: Primary | ICD-10-CM

## 2024-10-21 DIAGNOSIS — Z08 ENCOUNTER FOR FOLLOW-UP SURVEILLANCE OF MELANOMA: ICD-10-CM

## 2024-10-21 DIAGNOSIS — Z13.89 ENCOUNTER FOR SURVEILLANCE OF ABNORMAL NEVI: ICD-10-CM

## 2024-10-21 DIAGNOSIS — Z85.820 ENCOUNTER FOR FOLLOW-UP SURVEILLANCE OF MELANOMA: ICD-10-CM

## 2024-10-21 DIAGNOSIS — L81.4 SOLAR LENTIGO: ICD-10-CM

## 2024-10-21 DIAGNOSIS — Z86.018 HISTORY OF DYSPLASTIC NEVUS: ICD-10-CM

## 2024-10-21 PROCEDURE — 99213 OFFICE O/P EST LOW 20 MIN: CPT | Performed by: DERMATOLOGY

## 2024-10-21 PROCEDURE — 17000 DESTRUCT PREMALG LESION: CPT | Performed by: DERMATOLOGY

## 2024-11-03 NOTE — PROGRESS NOTES
Aric Allison is a 59 year old male.  HPI:     CC:    Chief Complaint   Patient presents with    Full Skin Exam     LOV 4/22/24- pt presents for full skin exam- pt states new lesion on forehead, pt has hx of melanoma and dysplastic nevus         Allergies:  Patient has no known allergies.    HISTORY:    Past Medical History:    Actinic keratosis    Nose    Dysplastic nevus    mild dysplasia, right mid back    Melanoma of skin (HCC)    Right shoulder 0.4mm      Past Surgical History:   Procedure Laterality Date    Colonoscopy      Other surgical history  1986    thumb repair    Vasectomy        Family History   Problem Relation Age of Onset    Diabetes Father     Obesity Father       Social History     Socioeconomic History    Marital status:    Tobacco Use    Smoking status: Never    Smokeless tobacco: Never   Substance and Sexual Activity    Alcohol use: Yes     Alcohol/week: 4.0 standard drinks of alcohol     Types: 3 Cans of beer, 1 Shots of liquor per week    Drug use: Never   Other Topics Concern    Grew up on a farm No    History of tanning No    Outdoor occupation No    Reaction to local anesthetic No    Pt has a pacemaker No    Pt has a defibrillator No     Social Drivers of Health     Financial Resource Strain: Low Risk  (8/1/2024)    Received from [facility]    Overall Financial Resource Strain (CARDIA)     Difficulty of Paying Living Expenses: Not hard at all   Food Insecurity: No Food Insecurity (8/1/2024)    Received from [facility]    Hunger Vital Sign     Worried About Running Out of Food in the Last Year: Never true     Ran Out of Food in the Last Year: Never true   Transportation Needs: No Transportation Needs (8/1/2024)    Received from [facility]    PRAPARE - Transportation     Lack of Transportation (Medical): No     Lack of Transportation (Non-Medical): No   Housing Stability: Low Risk  (8/1/2024)    Received from [facility]    Housing Stability Vital Sign     Unable to Pay for Housing in the Last Year: No     Number of Places Lived in the Last Year: 1     Unstable Housing in the Last Year: No        Current Outpatient Medications   Medication Sig Dispense Refill    Sildenafil Citrate 100 MG Oral Tab Take 0.5 tablets (50 mg total) by mouth.      tamsulosin (FLOMAX) cap Take 1 capsule (0.4 mg total) by mouth daily.      QUICKVUE AT-HOME COVID-19 TEST In Vitro Kit AS DIR (Patient not taking: Reported on 10/21/2024)       Allergies:   No Known Allergies    Past Medical History:    Actinic keratosis    Nose    Dysplastic nevus    mild dysplasia, right mid back    Melanoma of skin (HCC)    Right shoulder 0.4mm     Past Surgical History:   Procedure Laterality Date    Colonoscopy      Other surgical history  1986    thumb repair    Vasectomy       Social History     Socioeconomic History    Marital status:      Spouse name: Not on file    Number of children: Not on file    Years of education: Not on file    Highest education level: Not on file   Occupational History    Not on file   Tobacco Use    Smoking status: Never    Smokeless tobacco: Never   Substance and Sexual Activity    Alcohol use: Yes     Alcohol/week: 4.0 standard drinks of alcohol     Types: 3 Cans of beer, 1 Shots of liquor per week    Drug use: Never    Sexual activity: Not on file   Other Topics Concern    Grew up on a farm No    History of tanning No    Outdoor occupation No    Reaction to local anesthetic No    Pt has a pacemaker No    Pt has a defibrillator No   Social History Narrative    Not on file     Social Drivers of Health     Financial Resource Strain: Low Risk  (8/1/2024)    Received from [facility]    Overall Financial Resource Strain (CARDIA)     Difficulty of Paying Living Expenses: Not hard at all   Food Insecurity: No Food Insecurity (8/1/2024)    Received from [facility]    Hunger  Vital Sign     Worried About Running Out of Food in the Last Year: Never true     Ran Out of Food in the Last Year: Never true   Transportation Needs: No Transportation Needs (8/1/2024)    Received from [facility]    PRAPARE - Transportation     Lack of Transportation (Medical): No     Lack of Transportation (Non-Medical): No   Physical Activity: Not on file   Stress: Not on file   Social Connections: Not on file   Housing Stability: Low Risk  (8/1/2024)    Received from [facility]    Housing Stability Vital Sign     Unable to Pay for Housing in the Last Year: No     Number of Places Lived in the Last Year: 1     Unstable Housing in the Last Year: No     Family History   Problem Relation Age of Onset    Diabetes Father     Obesity Father        There were no vitals filed for this visit.    HPI:    Chief Complaint   Patient presents with    Full Skin Exam     LOV 4/22/24- pt presents for full skin exam- pt states new lesion on forehead, pt has hx of melanoma and dysplastic nevus     Patient with history of melanoma dysplastic nevi lower back  Overall has been doing well no particular lesions of concern has been careful sun protection.  History of AK's.   Doing some traveling.  Careful with sun protection.      No particular concerns will continue to be careful sun protection past notes/ records and appropriate/relevant lab results including pathology and past body maps reviewed. Including outside notes/ PCP notes as appropriate. Updated and new information noted in current visit.     Patient presents with concerns above.    Patient has been in their usual state of health.      History, medications, allergies reviewed as noted.      ROS:  new relevant systemic complaints as noted       Physical Examination:     Well-developed well-nourished patient alert oriented in no acute distress.  Exam total-body performed, including scalp, head, neck, face,nails, hair, external eyes,  including conjunctival mucosa, eyelids, lips external ears, back, chest,/ breasts, axillae,  abdomen, arms, legs, palms.     Multiple light to medium brown, well marginated, uniformly pigmented, macules and papules 6 mm and less scattered on exam. pigmented lesions examined with dermoscopy benign-appearing patterns.     Waxy tannish keratotic papules scattered, cherry-red vascular papules scattered.    See map today's date for lesions noted .      Otherwise remarkable for lesions as noted on map.  See details of examination  See Assessment /Plan for additional history and physical exam also:    Assessment / plan:    No orders of the defined types were placed in this encounter.      Meds & Refills for this Visit:  Requested Prescriptions      No prescriptions requested or ordered in this encounter         Encounter Diagnoses   Name Primary?    Actinic keratosis Yes    Solar lentigo     SK (seborrheic keratosis)     Multiple melanocytic nevi     History of dysplastic nevus     Encounter for follow-up surveillance of melanoma     Encounter for surveillance of abnormal nevi     Sun-damaged skin        See details on map.      Remarkable for:  Erythematous scaling keratotic papules noted at sites noted on map  Actinic Keratoses.  Precancerous nature discussed. Sun protection, sunscreen/ blocks encouraged Lesions treated with cryo- .  Biopsy if not resolved.    Right helix  Continue careful sun protection consider topicals, additional procedure    SK left temple stable continue monitoring    Otherwise pigmented lesions stable no other significant changes.    Multiple lentigines over the face    History of melanoma 0.4 mm right shoulder no recurrence, history dysplastic nevus mildly dysplastic at right interscapular back  Observe no evidence of recurrence    Multiple nevi, lentigines no significant changes in exam.  Nothing else new or different.    Skin tags over the lower back, multiple keratoses over the lower  extremities, no new suspicious lesions  Multiple benign keratoses over the back  Numerous nevi over the back appear benign on dermoscopy    Darker brown macule slightly scaly at right pretibial leg observe carefully 4 mm.  Follow-up ideally every 4 to 6 months.  Sunscreen and sun protection.  Self exams    No other susupicious lesions on todays  exam.    Please refer to map for specific lesions.  See additional diagnoses.  Pros cons of various therapies, risks benefits discussed.Pathophysiology discussed with patient.  Therapeutic options reviewed.  See  Medications in grid.  Instructions reviewed at length.    Benign nevi, seborrheic  keratoses, cherry angiomas:  Reassurance regarding other benign skin lesions.Signs and symptoms of skin cancer, ABCDE's of melanoma discussed with patient. Sunscreen use, sun protection, self exams reviewed.  Followup as noted RTC routine checkup 6 mos - one year or p.r.n.    Encounter Times Including precharting, reviewing chart, prior notes obtaining history: 10 minutes, medical exam :10 minutes, notes on body map, plan, counseling 10minutes My total time spent caring for the patient on the day of the encounter: 30 minutes     The patient indicates understanding of these issues and agrees to the plan.  The patient is asked to return as noted in follow-up/ above.    This note was generated using Dragon voice recognition software.  Please contact me regarding any confusion resulting from errors in recognition.   Note to patient and family: The 21st Century Cures Act makes medical notes like these available to patients. However, be advised this is a medical document. It is intended as [id number] communication and monitoring of a patient's care needs. It is written in medical language and may contain abbreviations or verbiage that are unfamiliar. It may appear blunt or direct. Medical documents are intended to carry relevant information, facts as evident and the clinical opinion of  the practitioner.

## 2025-01-20 ENCOUNTER — HOSPITAL ENCOUNTER (EMERGENCY)
Facility: HOSPITAL | Age: 60
Discharge: HOME | End: 2025-01-21
Attending: EMERGENCY MEDICINE
Payer: COMMERCIAL

## 2025-01-20 DIAGNOSIS — R33.9 URINARY RETENTION: Primary | ICD-10-CM

## 2025-01-20 PROCEDURE — 99285 EMERGENCY DEPT VISIT HI MDM: CPT

## 2025-01-20 PROCEDURE — 96375 TX/PRO/DX INJ NEW DRUG ADDON: CPT

## 2025-01-20 PROCEDURE — 51702 INSERT TEMP BLADDER CATH: CPT

## 2025-01-20 PROCEDURE — 96374 THER/PROPH/DIAG INJ IV PUSH: CPT

## 2025-01-20 RX ORDER — MORPHINE SULFATE 4 MG/ML
4 INJECTION, SOLUTION INTRAMUSCULAR; INTRAVENOUS ONCE
Status: COMPLETED | OUTPATIENT
Start: 2025-01-20 | End: 2025-01-20

## 2025-01-20 RX ORDER — ONDANSETRON 2 MG/ML
4 INJECTION INTRAMUSCULAR; INTRAVENOUS ONCE
Status: COMPLETED | OUTPATIENT
Start: 2025-01-20 | End: 2025-01-20

## 2025-01-20 RX ORDER — LIDOCAINE HYDROCHLORIDE 20 MG/ML
10 JELLY TOPICAL ONCE
Status: COMPLETED | OUTPATIENT
Start: 2025-01-20 | End: 2025-01-20

## 2025-01-21 VITALS
WEIGHT: 185 LBS | RESPIRATION RATE: 18 BRPM | DIASTOLIC BLOOD PRESSURE: 79 MMHG | BODY MASS INDEX: 25.06 KG/M2 | TEMPERATURE: 99 F | SYSTOLIC BLOOD PRESSURE: 130 MMHG | HEART RATE: 83 BPM | HEIGHT: 72 IN | OXYGEN SATURATION: 94 %

## 2025-01-21 PROCEDURE — 96376 TX/PRO/DX INJ SAME DRUG ADON: CPT

## 2025-01-21 RX ORDER — MORPHINE SULFATE 2 MG/ML
2 INJECTION, SOLUTION INTRAMUSCULAR; INTRAVENOUS ONCE
Status: DISCONTINUED | OUTPATIENT
Start: 2025-01-21 | End: 2025-01-21

## 2025-01-21 RX ORDER — MORPHINE SULFATE 4 MG/ML
4 INJECTION, SOLUTION INTRAMUSCULAR; INTRAVENOUS ONCE
Status: COMPLETED | OUTPATIENT
Start: 2025-01-21 | End: 2025-01-21

## 2025-01-21 RX ORDER — LIDOCAINE HYDROCHLORIDE 20 MG/ML
10 JELLY TOPICAL ONCE
Status: COMPLETED | OUTPATIENT
Start: 2025-01-21 | End: 2025-01-21

## 2025-01-21 NOTE — ED NOTES
Pt medicated, as ordered.  Urologist placed buck catheter, draining well to gravity.  Vital signs reassessed.  Pt cleared for discharge by ED MD.    RN reviewed discharge instructions with pt and family at bedside.  Key points highlighted, RN emphasized home care for buck catheter.  No questions at this time.    #20 PIV to right forearm discontinued with no complications. Pt dressed self.  Ambulatory to lobby with steady gait.  Left in good condition.

## 2025-01-21 NOTE — ED INITIAL ASSESSMENT (HPI)
Prostate procedure 1 mos ago; over last two weeks having difficulty voiding again. Last void 0300. Decreased stream and urinary urgency present.    Also reports diarrhea.

## 2025-01-21 NOTE — CONSULTS
[facility]      IDENTIFYING DATA  Patient is Aric hopkins 59 year old male. MRN is [id number]    Admitting physician: Karmen Moreno MD  Primary care physician: BLANCA LACKEY MD    CHIEF COMPLAINT  Chief Complaint   Patient presents with    Urinary Symptoms    Retention             HISTORY OF PRESENT ILLNESS  59 year old male presents with urinary retention.  Patient has a history of BPH and is status post Rezum in December at [place].  He was voiding well postoperatively until the last week or so.  During that time his urine stream has slowed significantly.  Since 3 AM yesterday he is only passed small amounts of urine.  Complaining of bladder pressure.  Numerous attempts to place a Kyle catheter in the ER were unsuccessful.   consulted for further evaluation.    PAST UROLOGICAL HISTORY BY ORGAN SYSTEM  See HPI    PAST MEDICAL HISTORY  Past Medical History:    Actinic keratosis    Nose    Dysplastic nevus    mild dysplasia, right mid back    Melanoma of skin (HCC)    Right shoulder 0.4mm             PAST SURGICAL HISTORY  Past Surgical History:   Procedure Laterality Date    Colonoscopy      Other surgical history  1986    thumb repair    Vasectomy         PAST FAMILY HISTORY  Family History   Problem Relation Age of Onset    Diabetes Father     Obesity Father        PAST SOCIAL HISTORY  Social History     Socioeconomic History    Marital status:      Spouse name: Not on file    Number of children: Not on file    Years of education: Not on file    Highest education level: Not on file   Occupational History    Not on file   Tobacco Use    Smoking status: Never    Smokeless tobacco: Never   Vaping Use    Vaping status: Never Used   Substance and Sexual Activity    Alcohol use: Yes     Alcohol/week: 4.0 standard drinks of alcohol     Types: 3 Cans of beer, 1 Shots of liquor per week    Drug use: Never    Sexual activity: Not on file   Other Topics Concern    Grew up on a farm  No    History of tanning No    Outdoor occupation No    Reaction to local anesthetic No    Pt has a pacemaker No    Pt has a defibrillator No   Social History Narrative    Not on file     Social Drivers of Health     Financial Resource Strain: Low Risk  (8/1/2024)    Received from [facility]    Overall Financial Resource Strain (CARDIA)     Difficulty of Paying Living Expenses: Not hard at all   Food Insecurity: No Food Insecurity (8/1/2024)    Received from [facility]    Hunger Vital Sign     Worried About Running Out of Food in the Last Year: Never true     Ran Out of Food in the Last Year: Never true   Transportation Needs: No Transportation Needs (8/1/2024)    Received from [facility]    PRAPARE - Transportation     Lack of Transportation (Medical): No     Lack of Transportation (Non-Medical): No   Physical Activity: Not on file   Stress: Not on file   Social Connections: Not on file   Housing Stability: Low Risk  (8/1/2024)    Received from [facility]    Housing Stability Vital Sign     Unable to Pay for Housing in the Last Year: No     Number of Places Lived in the Last Year: 1     Unstable Housing in the Last Year: No       MEDICATIONS    Current Outpatient Medications:     QUICKVUE AT-HOME COVID-19 TEST In Vitro Kit, AS DIR (Patient not taking: Reported on 10/21/2024), Disp: , Rfl:     Sildenafil Citrate 100 MG Oral Tab, Take 0.5 tablets (50 mg total) by mouth., Disp: , Rfl:     tamsulosin (FLOMAX) cap, Take 1 capsule (0.4 mg total) by mouth daily., Disp: , Rfl:     ALLERGIES  Allergies[1]       REVIEW OF SYSTEMS  Constitutional symptoms:(-) fever  Eyes: (-) blurred vision  Neurological: (-) tremors  Endocrine: (-) feeling too hot/cold   Gastrointestinal:(-)  abdominal pain   (-) nausea/vomiting   Cardiovascular: (-) chest pain  Integumentary: (-)  skin rash   Musculoskeletal: (-) back pain  Ear/Nose/Throat/Mouth:  (-)  sore throat   Genitourinary:see HPI  Respiratory:  (-) SOB (-) HOLLIS  Hematologic/Lymphatic: (-) blood clotting problem    PHYSICAL EXAMINATIONS    Vital Signs:   Vitals:    01/20/25 2119 01/21/25 0019   BP: 135/84 158/84   Pulse: 97 92   Resp: 18    Temp: 98.5 °F (36.9 °C)    SpO2: 96% 95%   Weight: 185 lb (83.9 kg)    Height: 6' (1.829 m)        Constitutional: General appearance: appears well, alert and oriented x 3, pleasant and cooperative.  Neuro: No gross deficits  Skin: Normal color, turgor  HEENT: Neck supple  Chest: Normal respiratory effort  CV: Normal perfusion  Abdomen: Soft with suprapubic fullness, tenderness.  Phallus without lesion      REVIEW OF LABORATORY, PATHOLOGY, AND RADIOLOGY DATA    Reviewed      ASSESSMENT AND PLAN BY PROBLEM  59 year old male presents with acute urinary retention.  He is status post Rezum around 1 month ago at [place].    -Procedure: Sterile technique used.  2% lidocaine jelly used for local anesthesia.  I initially attempted to advance a zip wire into the bladder however this was unsuccessful despite numerous attempts.  We then used the flexible cystoscope to assist with catheter placement.  Patient's anterior urethra was normal in appearance.  Within the prostatic urethra there was significant debris from recent Rezum procedure.  I was able to navigate through the debris and into the bladder which was distended.  Zip wire advanced into the bladder.  Over the wire I advanced a 16 Amharic [place] tip catheter without difficulty.  10 mL in the balloon.  Urine clear yellow.  The patient tolerated the procedure well.    -Maintain Kyle at discharge.  He will follow-up with [place] u[facility] for voiding trial.  -Discussed with Dr. Moreno    Thank you for this consult.    Feliz Naidu DO  [facility]  Office [phone]               [1] No Known Allergies

## 2025-01-21 NOTE — ED PROVIDER NOTES
Patient Seen in: [facility] Emergency Department      History     Chief Complaint   Patient presents with    Urinary Symptoms    Retention     Stated Complaint: Urinary problems    Subjective:   HPI      59 year old male with h/o bph s/p rezum procedure 1 month ago who now presents with inability to urinate.  Patient reports first 2 weeks after the procedure went well, then 1 week ago he noticed decreased urine stream.  His urology office tested his urine at that time to see if there was any infection and it was negative.  Tonight he feels like he has not gotten any urine out despite trying and feeling the urge to urinate.    Objective:     Past Medical History:    Actinic keratosis    Nose    Dysplastic nevus    mild dysplasia, right mid back    Melanoma of skin (HCC)    Right shoulder 0.4mm              Past Surgical History:   Procedure Laterality Date    Colonoscopy      Other surgical history  1986    thumb repair    Vasectomy                  Social History     Socioeconomic History    Marital status:    Tobacco Use    Smoking status: Never    Smokeless tobacco: Never   Vaping Use    Vaping status: Never Used   Substance and Sexual Activity    Alcohol use: Yes     Alcohol/week: 4.0 standard drinks of alcohol     Types: 3 Cans of beer, 1 Shots of liquor per week    Drug use: Never   Other Topics Concern    Grew up on a farm No    History of tanning No    Outdoor occupation No    Reaction to local anesthetic No    Pt has a pacemaker No    Pt has a defibrillator No     Social Drivers of Health     Financial Resource Strain: Low Risk  (8/1/2024)    Received from [facility]    Overall Financial Resource Strain (CARDIA)     Difficulty of Paying Living Expenses: Not hard at all   Food Insecurity: No Food Insecurity (8/1/2024)    Received from [facility]    Hunger Vital Sign     Worried About Running Out of Food in the Last Year: Never true     Ran Out of  Food in the Last Year: Never true   Transportation Needs: No Transportation Needs (8/1/2024)    Received from [facility]    PRAPARE - Transportation     Lack of Transportation (Medical): No     Lack of Transportation (Non-Medical): No   Housing Stability: Low Risk  (8/1/2024)    Received from [facility]    Housing Stability Vital Sign     Unable to Pay for Housing in the Last Year: No     Number of Places Lived in the Last Year: 1     Unstable Housing in the Last Year: No                  Physical Exam     ED Triage Vitals [01/20/25 2119]   /84   Pulse 97   Resp 18   Temp 98.5 °F (36.9 °C)   Temp src    SpO2 96 %   O2 Device None (Room air)       Current Vitals:   Vital Signs  BP: 158/84  Pulse: 92  Resp: 18  Temp: 98.5 °F (36.9 °C)  MAP (mmHg): (!) 106    Oxygen Therapy  SpO2: 95 %  O2 Device: None (Room air)        Physical Exam  Vitals and nursing note reviewed.   Constitutional:       General: He is not in acute distress.     Appearance: Normal appearance. He is well-developed. He is not ill-appearing, toxic-appearing or diaphoretic.   HENT:      Head: Normocephalic and atraumatic.   Eyes:      Conjunctiva/sclera: Conjunctivae normal.      Pupils: Pupils are equal, round, and reactive to light.   Cardiovascular:      Rate and Rhythm: Normal rate and regular rhythm.      Pulses: Normal pulses.      Heart sounds: Normal heart sounds. No murmur heard.  Pulmonary:      Effort: Pulmonary effort is normal. No respiratory distress.      Breath sounds: Normal breath sounds. No wheezing.   Abdominal:      General: There is no distension.      Palpations: Abdomen is soft.      Tenderness: There is no abdominal tenderness. There is no guarding.   Genitourinary:     Penis: Circumcised.    Musculoskeletal:         General: No tenderness. Normal range of motion.      Cervical back: Full passive range of motion without pain, normal range of motion and neck supple. No rigidity.  Normal range of motion.      Right lower leg: No edema.      Left lower leg: No edema.   Skin:     General: Skin is warm and dry.      Findings: No rash.   Neurological:      Mental Status: He is alert and oriented to person, place, and time.      GCS: GCS eye subscore is 4. GCS verbal subscore is 5. GCS motor subscore is 6.      Sensory: Sensation is intact. No sensory deficit.      Motor: Motor function is intact. No weakness.   Psychiatric:         Attention and Perception: Attention normal.         Mood and Affect: Mood normal.         Behavior: Behavior normal. Behavior is cooperative.           ED Course   Labs Reviewed - No data to display       MDM      Pulse Ox: 94%, Normal, RA    Medications   lidocaine (Urojet) 2 % urethral jelly 10 mL (10 mL Urethral Given 1/20/25 2230)   morphINE PF 4 MG/ML injection 4 mg (4 mg Intravenous Given 1/20/25 2353)   ondansetron (Zofran) 4 MG/2ML injection 4 mg (4 mg Intravenous Given 1/20/25 2352)   morphINE PF 4 MG/ML injection 4 mg (4 mg Intravenous Given 1/21/25 0042)   lidocaine (Urojet) 2 % urethral jelly 10 mL (10 mL Urethral Given 1/21/25 0041)     Both the nurse and I attempted multiple times to pass different urinary catheters including 16 and 18 French coudé with no success.  Patient still unable to urinate spontaneously and has over 700 cc on his bladder scan.  I spoke with urology on-call, Dr. Niadu, who came into the ER to place cystoscope-assisted catheter.  Urine now successfully draining, patient has follow-up appointment with his urologist at [place] this afternoon. Pt sent home with buck catheter in place.         Disposition and Plan     Clinical Impression:  1. Urinary retention         Disposition:  Discharge  1/21/2025  2:04 am    Follow-up:  Kylah Lynn, IBAN  [address]  [address] [place] [address] [id number] [phone]    Follow up  Keep your appointment at [facility] later today          Medications  Prescribed:  Current Discharge Medication List              Supplementary Documentation:

## 2025-04-21 ENCOUNTER — OFFICE VISIT (OUTPATIENT)
Dept: DERMATOLOGY CLINIC | Facility: CLINIC | Age: 60
End: 2025-04-21
Payer: COMMERCIAL

## 2025-04-21 DIAGNOSIS — Z13.89 ENCOUNTER FOR SURVEILLANCE OF ABNORMAL NEVI: ICD-10-CM

## 2025-04-21 DIAGNOSIS — L57.8 SUN-DAMAGED SKIN: ICD-10-CM

## 2025-04-21 DIAGNOSIS — L57.0 ACTINIC KERATOSIS: Primary | ICD-10-CM

## 2025-04-21 DIAGNOSIS — D18.01 HEMANGIOMA OF SKIN AND SUBCUTANEOUS TISSUE: ICD-10-CM

## 2025-04-21 DIAGNOSIS — Z85.820 ENCOUNTER FOR FOLLOW-UP SURVEILLANCE OF MELANOMA: ICD-10-CM

## 2025-04-21 DIAGNOSIS — L82.1 SK (SEBORRHEIC KERATOSIS): ICD-10-CM

## 2025-04-21 DIAGNOSIS — D23.9 BENIGN NEOPLASM OF SKIN, UNSPECIFIED LOCATION: ICD-10-CM

## 2025-04-21 DIAGNOSIS — D22.9 MULTIPLE MELANOCYTIC NEVI: ICD-10-CM

## 2025-04-21 DIAGNOSIS — Z08 ENCOUNTER FOR FOLLOW-UP SURVEILLANCE OF MELANOMA: ICD-10-CM

## 2025-04-21 DIAGNOSIS — L81.4 SOLAR LENTIGO: ICD-10-CM

## 2025-04-21 DIAGNOSIS — Z86.018 HISTORY OF DYSPLASTIC NEVUS: ICD-10-CM

## 2025-04-21 RX ORDER — IMIQUIMOD 12.5 MG/.25G
CREAM TOPICAL
Qty: 12 EACH | Refills: 1 | Status: SHIPPED | OUTPATIENT
Start: 2025-04-21

## 2025-04-21 NOTE — PROGRESS NOTES
The following individual(s) verbally consented to be recorded using ambient AI listening technology and understand that they can each withdraw their consent to this listening technology at any point by asking the clinician to turn off or pause the recording:    Patient name: Aric Allison  Additional names:

## 2025-04-27 NOTE — PROGRESS NOTES
Aric Allison is a 59 year old male.  HPI:     CC:    Chief Complaint   Patient presents with    Melanoma     LOV 10/21/24  Pt with Hx of Ak's and melanoma, presents for 6 month skin check.   Denies any concerns at this time.         Allergies:  Patient has no known allergies.    HISTORY:    Past Medical History:    Actinic keratosis    Nose    Dysplastic nevus    mild dysplasia, right mid back    Melanoma of skin (HCC)    Right shoulder 0.4mm      Past Surgical History:   Procedure Laterality Date    Colonoscopy      Other surgical history  1986    thumb repair    Vasectomy        Family History   Problem Relation Age of Onset    Diabetes Father     Obesity Father       Social History     Socioeconomic History    Marital status:    Tobacco Use    Smoking status: Never    Smokeless tobacco: Never   Vaping Use    Vaping status: Never Used   Substance and Sexual Activity    Alcohol use: Yes     Alcohol/week: 4.0 standard drinks of alcohol     Types: 3 Cans of beer, 1 Shots of liquor per week    Drug use: Never   Other Topics Concern    Grew up on a farm No    History of tanning No    Outdoor occupation No    Reaction to local anesthetic No    Pt has a pacemaker No    Pt has a defibrillator No     Social Drivers of Health     Food Insecurity: No Food Insecurity (4/7/2025)    Received from [facility]    Hunger Vital Sign     Worried About Running Out of Food in the Last Year: Never true     Ran Out of Food in the Last Year: Never true   Transportation Needs: No Transportation Needs (4/7/2025)    Received from [facility]    PRAPARE - Transportation     Lack of Transportation (Medical): No     Lack of Transportation (Non-Medical): No   Housing Stability: Low Risk  (8/1/2024)    Received from [facility]    Housing Stability Vital Sign     Unable to Pay for Housing in the Last Year: No     Number of Places Lived in the Last Year: 1     Unstable  Housing in the Last Year: No        Current Outpatient Medications   Medication Sig Dispense Refill    Imiquimod 5 % External Cream Apply topically 2 times every week x 6 weeks as directed to temples, cheeks , around eyebrows for actinic keratoses 12 each 1    Sildenafil Citrate 100 MG Oral Tab Take 0.5 tablets (50 mg total) by mouth.      tamsulosin (FLOMAX) cap Take 1 capsule (0.4 mg total) by mouth daily.      QUICKVUE AT-HOME COVID-19 TEST In Vitro Kit AS DIR (Patient not taking: Reported on 4/21/2025)       Allergies:   No Known Allergies    Past Medical History:    Actinic keratosis    Nose    Dysplastic nevus    mild dysplasia, right mid back    Melanoma of skin (HCC)    Right shoulder 0.4mm     Past Surgical History:   Procedure Laterality Date    Colonoscopy      Other surgical history  1986    thumb repair    Vasectomy       Social History     Socioeconomic History    Marital status:      Spouse name: Not on file    Number of children: Not on file    Years of education: Not on file    Highest education level: Not on file   Occupational History    Not on file   Tobacco Use    Smoking status: Never    Smokeless tobacco: Never   Vaping Use    Vaping status: Never Used   Substance and Sexual Activity    Alcohol use: Yes     Alcohol/week: 4.0 standard drinks of alcohol     Types: 3 Cans of beer, 1 Shots of liquor per week    Drug use: Never    Sexual activity: Not on file   Other Topics Concern    Grew up on a farm No    History of tanning No    Outdoor occupation No    Reaction to local anesthetic No    Pt has a pacemaker No    Pt has a defibrillator No   Social History Narrative    Not on file     Social Drivers of Health     Food Insecurity: No Food Insecurity (4/7/2025)    Received from [facility]    Hunger Vital Sign     Worried About Running Out of Food in the Last Year: Never true     Ran Out of Food in the Last Year: Never true   Transportation Needs: No Transportation Needs  (4/7/2025)    Received from [facility]    PRAPARE - Transportation     Lack of Transportation (Medical): No     Lack of Transportation (Non-Medical): No   Stress: Not on file   Housing Stability: Low Risk  (8/1/2024)    Received from [facility]    Housing Stability Vital Sign     Unable to Pay for Housing in the Last Year: No     Number of Places Lived in the Last Year: 1     Unstable Housing in the Last Year: No     Family History   Problem Relation Age of Onset    Diabetes Father     Obesity Father        There were no vitals filed for this visit.    HPI:    Chief Complaint   Patient presents with    Melanoma     LOV 10/21/24  Pt with Hx of Ak's and melanoma, presents for 6 month skin check.   Denies any concerns at this time.     Patient with history of melanoma dysplastic nevi lower back  Overall has been doing well no particular lesions of concern has been careful sun protection.  History of AK's.   Doing some traveling.  Careful with sun protection.      No particular concerns will continue to be careful sun protection past notes/ records and appropriate/relevant lab results including pathology and past body maps reviewed. Including outside notes/ PCP notes as appropriate. Updated and new information noted in current visit.     History of Present Illness  Aric Allison is a 59 year old male who presents with a brown spot on his face.    He has a brown spot on the right side of his face, extending from the middle of the right eyebrow to the right cheekbone. He describes it as a 'little brown spot' and notes a change in texture in this area. No other symptoms are associated with this spot.    He denies having any chemotherapy cream at home and is not currently using any treatment for this spot.    He mentions plans to travel to [place] to visit his mother and her , indicating no immediate travel concerns related to his condition.      Patient presents with  concerns above.    Patient has been in their usual state of health.      History, medications, allergies reviewed as noted.      ROS:  new relevant systemic complaints as noted       Physical Examination:     Well-developed well-nourished patient alert oriented in no acute distress.  Exam total-body performed, including scalp, head, neck, face,nails, hair, external eyes, including conjunctival mucosa, eyelids, lips external ears, back, chest,/ breasts, axillae,  abdomen, arms, legs, palms.     Multiple light to medium brown, well marginated, uniformly pigmented, macules and papules 6 mm and less scattered on exam. pigmented lesions examined with dermoscopy benign-appearing patterns.     Waxy tannish keratotic papules scattered, cherry-red vascular papules scattered.    See map today's date for lesions noted .      Otherwise remarkable for lesions as noted on map.  See details of examination  See Assessment /Plan for additional history and physical exam also:    Assessment / plan:    No orders of the defined types were placed in this encounter.      Meds & Refills for this Visit:  Requested Prescriptions     Signed Prescriptions Disp Refills    Imiquimod 5 % External Cream 12 each 1     Sig: Apply topically 2 times every week x 6 weeks as directed to temples, cheeks , around eyebrows for actinic keratoses         Encounter Diagnoses   Name Primary?    Actinic keratosis Yes    Solar lentigo     SK (seborrheic keratosis)     Multiple melanocytic nevi     History of dysplastic nevus     Encounter for follow-up surveillance of melanoma     Encounter for surveillance of abnormal nevi     Sun-damaged skin     Benign neoplasm of skin, unspecified location     Hemangioma of skin and subcutaneous tissue            Physical Exam  SKIN: Left facial skin normal. Right facial skin with precancerous changes from right eyebrow to cheekbone.    Results        Assessment & Plan  Sun-damaged skin  Sun-damaged skin with precancerous  changes on the right side of the face, extending from the middle of the right eyebrow to the right cheekbone. Texture changes indicate precancerous changes, unsuitable for cryotherapy. Topical chemotherapy cream is preferred for targeting undiagnosed cells and stimulating the immune system to attack precancerous cells, acting as a field treatment to clear background changes.  - Prescribe topical chemotherapy cream to be applied twice a week for six weeks to the affected area on the right side of the face and the left side to clear undiagnosed cells.  - Educate on the cream's mechanism, which stimulates the immune system to attack precancerous cells.  - Reassess the area at the next follow-up visit.    Actinic keratosis  Actinic keratosis on the right side of the face with texture changes indicating precancerous lesions, unsuitable for cryotherapy. Topical chemotherapy cream is used as a field treatment to address these changes and prevent progression.  - Initiate field treatment with topical chemotherapy cream as described under sun-damaged skin.  - Reassess for response to treatment at the next visit.    Recording duration: 6 minutes      See details on map.      Remarkable for:    Actinic Keratoses.  Precancerous nature discussed. Sun protection, sunscreen/ blocks encouraged .  Monitoring for new lesions.  Sun damage additional recurrent and new actinic keratoses, skin cancers may occur in areas of prior actinic keratoses, related to past sun exposure to minimize current sun exposure.  Sunscreen applied consistently regularly, reapplication and sun protection while driving recommended.    Background AK's over the brows temples left somatic area cheeks plan imiquimod  Actinic keratoses.  Precancerous nature discussed.  Treatment options reviewed at length we will proceed with topical therapy with    imiquimod twice weekly for   6   week course  of actual medication use and may alternate weeks if necessary.   Increased redness scaling crusting irritation pain tenderness potential discussed.  Anticipate one month for resolution of symptoms.  Plan recheck in one month after completion of treatment course.  Consider alternative treatment biopsy if not resolved.    Continue careful sun protection consider topicals, additional procedure    SK left temple stable continue monitoring    Otherwise pigmented lesions stable no other significant changes.    Multiple lentigines over the face    History of melanoma 0.4 mm right shoulder no recurrence, history dysplastic nevus mildly dysplastic at right interscapular back  Observe no evidence of recurrence    Multiple nevi, lentigines no significant changes in exam.  Nothing else new or different.    Skin tags over the lower back, multiple keratoses over the lower extremities, no new suspicious lesions  Multiple benign keratoses over the back  Numerous nevi over the back appear benign on dermoscopy    Darker brown macule slightly scaly at right pretibial leg observe carefully 4 mm.  Follow-up ideally every 4 to 6 months.  Sunscreen and sun protection.  Self exams    No other susupicious lesions on todays  exam.    Please refer to map for specific lesions.  See additional diagnoses.  Pros cons of various therapies, risks benefits discussed.Pathophysiology discussed with patient.  Therapeutic options reviewed.  See  Medications in grid.  Instructions reviewed at length.    Benign nevi, seborrheic  keratoses, cherry angiomas:  Reassurance regarding other benign skin lesions.    Monitor for new or changing lesions. Signs and symptoms of skin cancer, ABCDE's of melanoma ( additional information available at AAD.org, skincancer.org) Encourage Sunscreen (broad-spectrum, ideally mineral-based-UVA/UVB -SPF 30 or higher) use encouraged, sun protection/sun protective clothing, self exams reviewed Followup as noted RTC ---routine checkup 6 mos -one year or p.r.n.    Encounter Times   Including  precharting, reviewing chart, prior notes obtaining history: 10 minutes, medical exam :10 minutes, notes on body map, plan, counseling 10minutes My total time spent caring for the patient on the day of the encounter: 30 minutes     The patient indicates understanding of these issues and agrees to the plan.  The patient is asked to return as noted in follow-up/ above.    This note was generated using Dragon voice recognition software.  Please contact me regarding any confusion resulting from errors in recognition..  Note to patient and family: The 21st Century Cures Act makes medical notes like these available to patients. However, be advised this is a medical document. It is intended as [id number] communication and monitoring of a patient's care needs. It is written in medical language and may contain abbreviations or verbiage that are unfamiliar. It may appear blunt or direct. Medical documents are intended to carry relevant information, facts as evident and the clinical opinion of the practitioner.